# Patient Record
Sex: FEMALE | Race: ASIAN | NOT HISPANIC OR LATINO
[De-identification: names, ages, dates, MRNs, and addresses within clinical notes are randomized per-mention and may not be internally consistent; named-entity substitution may affect disease eponyms.]

---

## 2021-05-09 ENCOUNTER — TRANSCRIPTION ENCOUNTER (OUTPATIENT)
Age: 46
End: 2021-05-09

## 2022-05-06 ENCOUNTER — EMERGENCY (EMERGENCY)
Facility: HOSPITAL | Age: 47
LOS: 1 days | Discharge: HOME | End: 2022-05-06
Attending: EMERGENCY MEDICINE
Payer: COMMERCIAL

## 2022-05-06 VITALS — HEIGHT: 67 IN | WEIGHT: 134.92 LBS

## 2022-05-06 VITALS
SYSTOLIC BLOOD PRESSURE: 110 MMHG | RESPIRATION RATE: 20 BRPM | DIASTOLIC BLOOD PRESSURE: 74 MMHG | OXYGEN SATURATION: 99 % | TEMPERATURE: 99 F | HEART RATE: 65 BPM

## 2022-05-06 PROCEDURE — 99285 EMERGENCY DEPT VISIT HI MDM: CPT | Mod: 25

## 2022-05-06 PROCEDURE — 29130 APPL FINGER SPLINT STATIC: CPT | Mod: RT

## 2022-05-06 PROCEDURE — 73060 X-RAY EXAM OF HUMERUS: CPT | Mod: 26,RT

## 2022-05-06 PROCEDURE — 73130 X-RAY EXAM OF HAND: CPT

## 2022-05-06 PROCEDURE — 73030 X-RAY EXAM OF SHOULDER: CPT | Mod: 26,RT

## 2022-05-06 PROCEDURE — 73130 X-RAY EXAM OF HAND: CPT | Mod: 26,RT

## 2022-05-06 PROCEDURE — 72125 CT NECK SPINE W/O DYE: CPT | Mod: MA

## 2022-05-06 PROCEDURE — 73110 X-RAY EXAM OF WRIST: CPT | Mod: 26,RT

## 2022-05-06 PROCEDURE — 73090 X-RAY EXAM OF FOREARM: CPT | Mod: 26,LT

## 2022-05-06 PROCEDURE — 73110 X-RAY EXAM OF WRIST: CPT

## 2022-05-06 PROCEDURE — 73080 X-RAY EXAM OF ELBOW: CPT | Mod: 26,RT

## 2022-05-06 PROCEDURE — 73090 X-RAY EXAM OF FOREARM: CPT

## 2022-05-06 PROCEDURE — 73060 X-RAY EXAM OF HUMERUS: CPT

## 2022-05-06 PROCEDURE — 99284 EMERGENCY DEPT VISIT MOD MDM: CPT | Mod: 25

## 2022-05-06 PROCEDURE — 29125 APPL SHORT ARM SPLINT STATIC: CPT

## 2022-05-06 PROCEDURE — 72125 CT NECK SPINE W/O DYE: CPT | Mod: 26,MA

## 2022-05-06 PROCEDURE — 73080 X-RAY EXAM OF ELBOW: CPT

## 2022-05-06 PROCEDURE — 73030 X-RAY EXAM OF SHOULDER: CPT

## 2022-05-06 RX ORDER — ACETAMINOPHEN 500 MG
975 TABLET ORAL ONCE
Refills: 0 | Status: COMPLETED | OUTPATIENT
Start: 2022-05-06 | End: 2022-05-06

## 2022-05-06 RX ADMIN — Medication 975 MILLIGRAM(S): at 13:53

## 2022-05-06 NOTE — ED PROVIDER NOTE - CARE PLAN
Principal Discharge DX:	Sprain or strain of right wrist or hand  Secondary Diagnosis:	Cervical strain   1
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
## 2022-05-06 NOTE — ED PROVIDER NOTE - PHYSICAL EXAMINATION
NAD. VSS. Afebrile, NO facial or scalp tender. Mild cervical midline tender without obvious swelling or lesions. Lungs clear. ABD soft, non tender, and no seat belt signs. +Right shoulder/elbow and wrist tender with diminished ROMS. NAD. VSS. Afebrile, +PERRL, EOMI. NO facial or scalp tender. Mild cervical midline tender without obvious swelling or lesions. Lungs clear. ABD soft, non tender, and no seat belt signs. +Right shoulder/elbow and wrist tender with diminished ROMS. Diminished sensory and strengths of right hand/fingers. +Palpable radial pulse and cap refill <2sec. NAD. VSS. Afebrile, +PERRL, EOMI. NO facial or scalp tender. Mild cervical midline tender without obvious swelling or lesions. Lungs clear. ABD soft, non tender, and no seat belt signs. +Right shoulder/elbow and wrist tender with diminished ROMS. Diminished sensory and strengths of right hand/fingers. +Palpable radial pulse and cap refill <2sec. +Swelling to right thumb with localized ecchymosis.
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
## 2022-05-06 NOTE — ED ADULT NURSE NOTE - NSIMPLEMENTINTERV_GEN_ALL_ED
Implemented All Fall Risk Interventions:  [place] to call system. Call bell, personal items and telephone within reach. Instruct patient to call for assistance. Room bathroom lighting operational. Non-slip footwear when patient is off stretcher. Physically safe environment: no spills, clutter or unnecessary equipment. Stretcher in lowest position, wheels locked, appropriate side rails in place. Provide visual cue, wrist band, yellow gown, etc. Monitor gait and stability. Monitor for mental status changes and reorient to person, place, and time. Review medications for side effects contributing to fall risk. Reinforce activity limits and safety measures with patient and family.

## 2022-05-06 NOTE — ED PROVIDER NOTE - ATTENDING APP SHARED VISIT CONTRIBUTION OF CARE
Attending MD Gonzalez:   I personally have seen and examined this patient.  NP note reviewed and agree on plan of care and except where noted.  See MDM for details. Please see my MDM for further details.

## 2022-05-06 NOTE — ED PROVIDER NOTE - CARE PROVIDER_API CALL
Tima Rivero)  Orthopaedic Surgery  [address], [address]  [place] [address]  Phone: [phone]  Fax: [phone]  Follow Up Time:     Aristides Duenas)  Orthopaedic Surgery  [address], [address]  [place] [address]  Phone: [phone]  Fax: [phone]  Follow Up Time:

## 2022-05-06 NOTE — ED PROVIDER NOTE - PATIENT PORTAL LINK FT
You can access the FollowMyHealth Patient Portal offered by [facility] by registering at the following website: [email or web address]. By joining [facility]’s FollowMyHealth portal, you will also be able to view your health information using other applications (apps) compatible with our system.

## 2022-05-06 NOTE — ED PROVIDER NOTE - NS ED ATTENDING STATEMENT MOD
This was a shared visit with the CLAUDINE. I reviewed and verified the documentation and independently performed the documented:

## 2022-05-06 NOTE — ED PROVIDER NOTE - PROGRESS NOTE DETAILS
Mild cervical midline tender without obvious swelling or lesion and Bearden J c-collar applied. Pt went to CT. RUE N/V- intact. C-collar cleared; no midline tender, full ROMs, and neuro-intact.

## 2022-05-09 PROBLEM — Z78.9 OTHER SPECIFIED HEALTH STATUS: Chronic | Status: ACTIVE | Noted: 2022-05-06

## 2022-05-10 ENCOUNTER — NON-APPOINTMENT (OUTPATIENT)
Age: 47
End: 2022-05-10

## 2022-05-10 ENCOUNTER — APPOINTMENT (OUTPATIENT)
Dept: MRI IMAGING | Facility: IMAGING CENTER | Age: 47
End: 2022-05-10
Payer: COMMERCIAL

## 2022-05-10 ENCOUNTER — OUTPATIENT (OUTPATIENT)
Dept: OUTPATIENT SERVICES | Facility: HOSPITAL | Age: 47
LOS: 1 days | End: 2022-05-10
Payer: COMMERCIAL

## 2022-05-10 ENCOUNTER — APPOINTMENT (OUTPATIENT)
Dept: ORTHOPEDIC SURGERY | Facility: CLINIC | Age: 47
End: 2022-05-10
Payer: COMMERCIAL

## 2022-05-10 DIAGNOSIS — S69.91XA UNSPECIFIED INJURY OF RIGHT WRIST, HAND AND FINGER(S), INITIAL ENCOUNTER: ICD-10-CM

## 2022-05-10 PROCEDURE — 29125 APPL SHORT ARM SPLINT STATIC: CPT | Mod: RT

## 2022-05-10 PROCEDURE — 73218 MRI UPPER EXTREMITY W/O DYE: CPT

## 2022-05-10 PROCEDURE — 99203 OFFICE O/P NEW LOW 30 MIN: CPT | Mod: 25

## 2022-05-10 PROCEDURE — 99072 ADDL SUPL MATRL&STAF TM PHE: CPT

## 2022-05-10 PROCEDURE — 73218 MRI UPPER EXTREMITY W/O DYE: CPT | Mod: 26,RT
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
## 2022-05-10 NOTE — RETURN TO WORK/SCHOOL
[FreeTextEntry1] : Ms. DIAMOND WRIGHT was seen in the office today on 05/10/2022 and evaluated by me for an Orthopedic visit. Please be advised that she will remain out of work until further notice at this time.\par \par Sincerely,\par \par Dr. Aristides Duenas M.D. on 05/10/2022.

## 2022-05-10 NOTE — DISCUSSION/SUMMARY
[FreeTextEntry1] : The underlying pathophysiology was reviewed with the patient. XR films were reviewed with the patient. Discussed at length the nature of the patient’s condition. The right thumb symptoms appear secondary to partial versus full thickness tear of the UCL to the MP joint.\par \par At this time, given her physical exam and lack of evidence of fracture to the hand, I am suspicious for a tear of the MP joint, UCL. I am therefore recommending an MRI for further evaluation. I did tell her depending on what the MRI results show, treatment options consist of either casting or surgical repair.\par An MRI of the left thumb was ordered to evaluate for UCL tear of the MP joint. Patient will follow-up to review the results and discuss further treatment recommendations.\par \par Finally, she was provided with a note stating that she will remain out of work until further notice. \par \par All questions answered, understanding verbalized. Patient in agreement with plan of care.

## 2022-05-10 NOTE — END OF VISIT
[FreeTextEntry3] : All medical record entries made by the Scribe were at my,  Dr. Aristides Duenas MD., direction and personally dictated by me on 05/10/2022. I have personally reviewed the chart and agree that the record accurately reflects my personal performance of the history, physical exam, assessment and plan.

## 2022-05-10 NOTE — PHYSICAL EXAM
[de-identified] : Patient is WDWN, alert, and in no acute distress. Breathing is unlabored. She is grossly oriented to person, place, and time.\par \par Right Wrist:\par There is bruising and discoloration noted dorsally through the hand, in the region of the CMC and MP joints.\par She is able to oppose the thumb to the index finger but with gross pain.\par Tenderness to palpation over the MP joint, in the region of the UCL. Nontender to the RCL.\par Edema noted to the digits.\par Numbness noted distally to the thumb [de-identified] : EXAM: XR ELBOW COMP MIN 3V RT\par EXAM: XR SHOULDER COMP MIN 2V RT\par EXAM: XR HUMERUS MIN 2 VIEWS RT\par EXAM: XR FOREARM 2 VIEWS RT\par EXAM: XR WRIST NAVICULAR MIN 3V RT\par EXAM: XR HAND MIN 3 VIEWS RT\par PROCEDURE DATE: 05/06/2022\par IMPRESSION:\par No tracking soft tissue gas collections or radiopaque foreign bodies.\par \par No fractures, dislocations, or osseous or joint deformities in above imaged anatomic regions.\par \par Preserved joint spaces throughout and no joint margin erosions.\par \par No discrete lytic or blastic lesions.\par \par ADAL LARSON MD; Attending Radiologist\par This document has been electronically signed. May 6 2022 3:07PM

## 2022-05-11 ENCOUNTER — NON-APPOINTMENT (OUTPATIENT)
Age: 47
End: 2022-05-11

## 2022-05-11 ENCOUNTER — APPOINTMENT (OUTPATIENT)
Dept: ORTHOPEDIC SURGERY | Facility: CLINIC | Age: 47
End: 2022-05-11
Payer: COMMERCIAL

## 2022-05-11 VITALS — WEIGHT: 135 LBS | BODY MASS INDEX: 21.19 KG/M2 | HEIGHT: 67 IN
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
## 2022-05-11 DIAGNOSIS — M54.2 CERVICALGIA: ICD-10-CM

## 2022-05-11 DIAGNOSIS — S13.9XXA SPRAIN OF JOINTS AND LIGAMENTS OF UNSPECIFIED PARTS OF NECK, INITIAL ENCOUNTER: ICD-10-CM

## 2022-05-11 DIAGNOSIS — M47.812 SPONDYLOSIS W/OUT MYELOPATHY OR RADICULOPATHY, CERVICAL REGION: ICD-10-CM

## 2022-05-11 DIAGNOSIS — M54.9 DORSALGIA, UNSPECIFIED: ICD-10-CM

## 2022-05-11 DIAGNOSIS — Z87.891 PERSONAL HISTORY OF NICOTINE DEPENDENCE: ICD-10-CM

## 2022-05-11 DIAGNOSIS — S39.012A STRAIN OF MUSCLE, FASCIA AND TENDON OF LOWER BACK, INITIAL ENCOUNTER: ICD-10-CM

## 2022-05-11 DIAGNOSIS — M54.41 LUMBAGO WITH SCIATICA, RIGHT SIDE: ICD-10-CM

## 2022-05-11 DIAGNOSIS — Z86.59 PERSONAL HISTORY OF OTHER MENTAL AND BEHAVIORAL DISORDERS: ICD-10-CM

## 2022-05-11 PROCEDURE — 99215 OFFICE O/P EST HI 40 MIN: CPT

## 2022-05-11 PROCEDURE — 72100 X-RAY EXAM L-S SPINE 2/3 VWS: CPT

## 2022-05-11 PROCEDURE — 99072 ADDL SUPL MATRL&STAF TM PHE: CPT

## 2022-05-11 RX ORDER — IBUPROFEN 800 MG/1
800 TABLET, FILM COATED ORAL
Qty: 90 | Refills: 0 | Status: ACTIVE | COMMUNITY
Start: 2022-05-11 | End: 1900-01-01

## 2022-05-11 NOTE — REASON FOR VISIT
[Initial Visit] : an initial visit for [No Fault] : this visit is related to no fault  [Degenerative Joint Disease] : degenerative joint disease [Back Pain] : back pain [Neck Pain] : neck pain [Radiculopathy] : radiculopathy
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
## 2022-05-11 NOTE — PHYSICAL EXAM
[de-identified] : She is fully alert and oriented with a normal mood and affect.  She is in no acute distress as the take the history.  She ambulates with a slow but otherwise normal gait.  She can tiptoe and heel walk.  There is no evidence of unsteadiness or spasticity.  There are no cutaneous abnormalities or palpable bony defects of the spine.  There is no evidence of shortness of breath or respiratory distress.  There is no paravertebral muscle spasm in the lower back but there is discomfort with side bending.  There is a trace of sciatic notch sensitivity on the right but none on the left.  There is some very mild right-sided trochanteric tenderness but none on the left.  Forward flexion of the spine to 60 degrees causes lower back discomfort.  Her lower extremity neurological examination revealed 1-2+ symmetrical reflexes.  She came planes of pain as I tested her reflexes.  Motor power is normal in manual testing in all lower extremity groups and sensation is normal to light touch in all dermatomes.  Straight leg raising is negative to 90 degrees in the sitting position bilaterally.  Her hips and her knees have a full and painless range of motion with normal stability.  Vascular examination shows no evidence of varicosities and there is no lymphedema.  There are no cutaneous abnormalities of the upper or lower extremities.  She has a splint on her right thumb and wrist secondary to her thumb injury.  Her upper extremity reflexes are 1+ and symmetrical.  I could not test her right brachial radialis reflex secondary to her splint.  Motor power is normal to manual testing in all upper extremity groups other than those that I could not test secondary to the splint on the right hand and.  Sensory exam is normal.  Shoulder range of motion is full but there is discomfort on range of motion of the right shoulder.  Range of motion of the cervical spine showed flexion with the chin reaching to within 2 fingerbreadths of the chest.  Extension at 40 degrees caused neck pain.  Rotation was to 60 degrees bilaterally with neck pain and tilt of 20 degrees bilaterally with neck pain. [de-identified] : I reviewed multiple outside x-rays of her upper extremity done in the emergency room.\par \par I reviewed a CT scan of the cervical spine.  Sagittal alignment is normal and there are no destructive changes.  There are some uncovertebral and disc space arthritic changes at the C5-6 level.  Sagittal alignment is normal and there are no destructive changes.  There is no evidence of a fracture.\par \par In light of her symptoms I obtained AP and lateral x-rays of the lumbar spine.  Sagittal alignment is normal and there are no destructive changes.  There is no evidence of a fracture.  There are some very minimal degenerative changes.

## 2022-05-11 NOTE — DISCUSSION/SUMMARY
[Medication Risks Reviewed] : Medication risks reviewed [de-identified] : She will rest and use moist heat.  I have started on ibuprofen 800 mg 3 times a day as a nonsteroidal anti-inflammatory.  She will call if there are problems with the medication or worsening of her symptoms and I will see her for follow-up in 3 weeks.  She is scheduled for an MRI of her right thumb and may need surgery for reconstruction of her ulnar collateral ligament.

## 2022-05-17 ENCOUNTER — APPOINTMENT (OUTPATIENT)
Dept: ORTHOPEDIC SURGERY | Facility: CLINIC | Age: 47
End: 2022-05-17

## 2022-05-26 ENCOUNTER — APPOINTMENT (OUTPATIENT)
Dept: ORTHOPEDIC SURGERY | Facility: CLINIC | Age: 47
End: 2022-05-26
Payer: COMMERCIAL

## 2022-05-26 DIAGNOSIS — S63.641A SPRAIN OF METACARPOPHALANGEAL JOINT OF RIGHT THUMB, INITIAL ENCOUNTER: ICD-10-CM

## 2022-05-26 PROCEDURE — 29075 APPL CST ELBW FNGR SHORT ARM: CPT | Mod: RT

## 2022-05-26 PROCEDURE — 99072 ADDL SUPL MATRL&STAF TM PHE: CPT

## 2022-05-26 PROCEDURE — 99213 OFFICE O/P EST LOW 20 MIN: CPT | Mod: 25

## 2022-05-27 NOTE — DISCUSSION/SUMMARY
[FreeTextEntry1] : The underlying pathophysiology was reviewed with the patient. MRI films were reviewed with the patient. Discussed at length the nature of the patient’s condition. The right thumb symptoms appear secondary to \par partial tearing of the volar oblique ligament at the thumb carpometacarpal joint.\par \par At this time, given the nature of the injury as documented above I have recommended cast immobilization for 6 weeks. I discussed with the patient that the cast will provide her with the most protection and give the ligament the best chance to heal.\par She was placed into a right thumb spica cast on 5/26/22.\par Proper cast care instructions were reviewed with the patient.\par She was advised that she may use the hand for ADLs while casted.\par \par All questions answered, understanding verbalized. Patient in agreement with plan of care. She will follow up in 6 weeks for cast removal. She was instructed to return sooner if she has issues while casted.

## 2022-05-27 NOTE — ADDENDUM
[FreeTextEntry1] : I, Elise Retana wrote this note acting as a scribe for Dr. Aristides Duenas on May 26, 2022.

## 2022-05-27 NOTE — END OF VISIT
[FreeTextEntry3] : All medical record entries made by the Scribe were at my,  Dr. Aristides Duenas MD., direction and personally dictated by me on 05/26/2022. I have personally reviewed the chart and agree that the record accurately reflects my personal performance of the history, physical exam, assessment and plan.

## 2022-05-27 NOTE — RETURN TO WORK/SCHOOL
[FreeTextEntry1] : Ms. DIAMOND WRIGHT was seen in the office today on 05/26/2022 and evaluated by me for an Orthopedic visit. Please be advised that she will remain out of work until further notice.\par \par Sincerely,\par \par Dr. Aristides Duenas M.D. on 05/26/2022.
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
## 2022-05-27 NOTE — PHYSICAL EXAM
[de-identified] : Patient is WDWN, alert, and in no acute distress. Breathing is unlabored. She is grossly oriented to person, place, and time.\par \par Right Wrist:\par Resolved bruising dorsally through the hand, in the region of the CMC and MP joints.\par She is able to oppose the thumb to the index finger but with gross pain.\par Tenderness to palpation over the MP joint, in the region of the UCL. Nontender to the RCL.\par Tenderness noted volarly along the CMC joint.\par Edema noted to the digits.\par Numbness noted distally to the thumb [de-identified] : EXAM: [id number] - MR THUMB RT\par PROCEDURE DATE:  05/10/2022\par IMPRESSION: \par No MCP joint UCL tear or Stener lesion.\par Partial tearing of the volar oblique ligament at the thumb carpometacarpal joint with periligamentous edema.\par \par YAIMA POPE MD; Attending Radiologist\par This document has been electronically signed. May 11 2022  8:33AM\par \par ------------------------------------------------------------------------------------------------------------------------------------------------------------------------------------\par \par EXAM: XR ELBOW COMP MIN 3V RT\par EXAM: XR SHOULDER COMP MIN 2V RT\par EXAM: XR HUMERUS MIN 2 VIEWS RT\par EXAM: XR FOREARM 2 VIEWS RT\par EXAM: XR WRIST NAVICULAR MIN 3V RT\par EXAM: XR HAND MIN 3 VIEWS RT\par PROCEDURE DATE: 05/06/2022\par IMPRESSION:\par No tracking soft tissue gas collections or radiopaque foreign bodies.\par \par No fractures, dislocations, or osseous or joint deformities in above imaged anatomic regions.\par \par Preserved joint spaces throughout and no joint margin erosions.\par \par No discrete lytic or blastic lesions.\par \par ADAL LARSON MD; Attending Radiologist\par This document has been electronically signed. May 6 2022 3:07PM

## 2022-06-02 ENCOUNTER — APPOINTMENT (OUTPATIENT)
Dept: ORTHOPEDIC SURGERY | Facility: CLINIC | Age: 47
End: 2022-06-02
Payer: COMMERCIAL

## 2022-06-02 PROCEDURE — 99072 ADDL SUPL MATRL&STAF TM PHE: CPT

## 2022-06-02 PROCEDURE — 29125 APPL SHORT ARM SPLINT STATIC: CPT | Mod: RT

## 2022-06-02 PROCEDURE — 99213 OFFICE O/P EST LOW 20 MIN: CPT | Mod: 25

## 2022-06-02 NOTE — END OF VISIT
[FreeTextEntry3] : All medical record entries made by the Scribe were at my,  Dr. Aristides Duenas MD., direction and personally dictated by me on 06/02/2022. I have personally reviewed the chart and agree that the record accurately reflects my personal performance of the history, physical exam, assessment and plan.

## 2022-06-02 NOTE — REASON FOR VISIT
[Follow-Up Visit] : a follow-up visit for [No Fault] : This visit is related to no fault  [FreeTextEntry2] : Right wrist

## 2022-06-02 NOTE — DISCUSSION/SUMMARY
[FreeTextEntry1] : The underlying pathophysiology was reviewed with the patient. MRI films were reviewed with the patient. Discussed at length the nature of the patient’s condition. The right thumb symptoms appear secondary to \par partial tearing of the volar oblique ligament at the thumb carpometacarpal joint.\par \par At this time, given her discomfort in the cast, the thumb spica cast was removed in office today on 6/2/22. We discussed further treatment options of placement of a new cast or immobilization in a splint which must be adorned at all times. She understands that if she uses the hand without the splint adorned, that she may tear the ligament further. \par She was placed into a well molded fiber glass splint for support again to be worn at all times.\par \par All questions answered, understanding verbalized. Patient in agreement with plan of care. Follow up in 2 weeks for reassessment.

## 2022-06-02 NOTE — PHYSICAL EXAM
[de-identified] : Patient is WDWN, alert, and in no acute distress. Breathing is unlabored. She is grossly oriented to person, place, and time.\par \par Right Hand:\par Patient presents in a right thumb spica cast which was removed in office today due to discomfort.\par While that cast was removed, there is resolved bruising dorsally through the hand, in the region of the CMC and MP joints. ROM to the thumb not accessed Tenderness noted volarly along the CMC joint. Numbness noted distally to the thumb  [de-identified] : EXAM: [id number] - MR THUMB RT\par PROCEDURE DATE: 05/10/2022\par IMPRESSION: \par No MCP joint UCL tear or Stener lesion.\par Partial tearing of the volar oblique ligament at the thumb carpometacarpal joint with periligamentous edema.\par \par YAIMA POPE MD; Attending Radiologist\par This document has been electronically signed. May 11 2022 8:33AM
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
## 2022-06-02 NOTE — ADDENDUM
[FreeTextEntry1] : I, Elise Retana wrote this note acting as a scribe for Dr. Aristides Duenas on Jun 02, 2022.

## 2022-07-07 ENCOUNTER — APPOINTMENT (OUTPATIENT)
Dept: ORTHOPEDIC SURGERY | Facility: CLINIC | Age: 47
End: 2022-07-07

## 2022-07-07 PROCEDURE — 99072 ADDL SUPL MATRL&STAF TM PHE: CPT

## 2022-07-07 PROCEDURE — 29125 APPL SHORT ARM SPLINT STATIC: CPT | Mod: 59,RT

## 2022-07-07 PROCEDURE — 20550 NJX 1 TENDON SHEATH/LIGAMENT: CPT | Mod: RT

## 2022-07-07 PROCEDURE — 99213 OFFICE O/P EST LOW 20 MIN: CPT | Mod: 25

## 2022-07-07 NOTE — PHYSICAL EXAM
[de-identified] : Patient is WDWN, alert, and in no acute distress. Breathing is unlabored. She is grossly oriented to person, place, and time.\par \par Right Hand:\par Patient presents in a right thumb spica cast which was removed in office today due to discomfort.\par While that cast was removed, there is resolved bruising dorsally through the hand, in the region of the CMC and MP joints. ROM to the thumb not accessed Tenderness noted volarly along the CMC joint. Numbness noted distally to the thumb  [de-identified] : EXAM: [id number] - MR THUMB RT\par PROCEDURE DATE: 05/10/2022\par IMPRESSION: \par No MCP joint UCL tear or Stener lesion.\par Partial tearing of the volar oblique ligament at the thumb carpometacarpal joint with periligamentous edema.\par \par YAIMA POPE MD; Attending Radiologist\par This document has been electronically signed. May 11 2022 8:33AM
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
## 2022-07-07 NOTE — DISCUSSION/SUMMARY
[FreeTextEntry1] : The underlying pathophysiology was reviewed with the patient. MRI films were reviewed with the patient. Discussed at length the nature of the patient’s condition. The right thumb symptoms appear secondary to \par partial tearing of the volar oblique ligament at the thumb carpometacarpal joint.\par \par The patient wishes to proceed with a cortisone injection at this time. \par \par The skin was prepped with alcohol and sprayed with  Ethyl Chloride. An injection of 0.5 cc 1% Lidocaine without epinephrine, 0.25 cc Kenalog 40 mg, and 0.25 cc  Dexamethasone was administered into the right first extensor compartment. The patient tolerated the procedure well. Rest  and apply ice.	\par \par Well fitting thumb spica fiberglass splint applied today.\par \par All questions answered, understanding verbalized. Patient in agreement with plan of care. Follow up in  4-6 weeks if pain persists.

## 2022-07-21 ENCOUNTER — APPOINTMENT (OUTPATIENT)
Dept: ORTHOPEDIC SURGERY | Facility: CLINIC | Age: 47
End: 2022-07-21

## 2022-08-11 ENCOUNTER — APPOINTMENT (OUTPATIENT)
Dept: ORTHOPEDIC SURGERY | Facility: CLINIC | Age: 47
End: 2022-08-11

## 2022-08-11 VITALS — WEIGHT: 126 LBS | HEIGHT: 67 IN | BODY MASS INDEX: 19.78 KG/M2

## 2022-08-11 PROCEDURE — 99213 OFFICE O/P EST LOW 20 MIN: CPT

## 2022-08-11 PROCEDURE — 99072 ADDL SUPL MATRL&STAF TM PHE: CPT

## 2022-08-11 RX ORDER — COVID-19 MOLECULAR TEST ASSAY
KIT MISCELLANEOUS
Qty: 6 | Refills: 0 | Status: ACTIVE | COMMUNITY
Start: 2022-07-12

## 2022-08-11 RX ORDER — MENTHOL 40 MG/ML
4 GEL TOPICAL
Qty: 1 | Refills: 0 | Status: ACTIVE | COMMUNITY
Start: 2022-08-11 | End: 1900-01-01

## 2022-08-11 RX ORDER — BENZONATATE 200 MG/1
200 CAPSULE ORAL
Qty: 30 | Refills: 0 | Status: ACTIVE | COMMUNITY
Start: 2022-07-13

## 2022-08-11 RX ORDER — CLONAZEPAM 0.5 MG/1
0.5 TABLET ORAL
Qty: 30 | Refills: 0 | Status: ACTIVE | COMMUNITY
Start: 2022-08-07

## 2022-08-11 RX ORDER — SERTRALINE 25 MG/1
25 TABLET, FILM COATED ORAL
Qty: 30 | Refills: 0 | Status: ACTIVE | COMMUNITY
Start: 2022-07-22

## 2022-08-11 RX ORDER — NIRMATRELVIR AND RITONAVIR 300-100 MG
20 X 150 MG & KIT ORAL
Qty: 30 | Refills: 0 | Status: ACTIVE | COMMUNITY
Start: 2022-07-12

## 2022-08-11 RX ORDER — CARIPRAZINE 1.5 MG/1
1.5 CAPSULE, GELATIN COATED ORAL
Qty: 30 | Refills: 0 | Status: ACTIVE | COMMUNITY
Start: 2022-07-22
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
## 2022-08-11 NOTE — END OF VISIT
[FreeTextEntry3] : All medical record entries made by the Scribe were at my,  Dr. Aristides Duenas MD., direction and personally dictated by me on 08/11/2022. I have personally reviewed the chart and agree that the record accurately reflects my personal performance of the history, physical exam, assessment and plan.

## 2022-08-11 NOTE — DISCUSSION/SUMMARY
[FreeTextEntry1] : The underlying pathophysiology was reviewed with the patient. MRI films were reviewed with the patient. Discussed at length the nature of the patient’s condition. The right thumb symptoms appear secondary to \par partial tearing of the volar oblique ligament at the thumb carpometacarpal joint.\par \par At this time, I recommended symptomatic treatment consisting of use of oral and topical antiinflammatories. She was instructed on use of a wrist brace as needed. She is scheduled to begin hand therapy. She did inquire about another MRI which I told her I wouldn't recommend as it will not add anything to her treated and that it will most likely still show some sort of abnormality given that ligaments take a great deal of time to heal.\par \par RX: Biofreeze.\par \par All questions answered, understanding verbalized. Patient in agreement with plan of care. Follow up in 3 months for reassessment.

## 2022-08-11 NOTE — ADDENDUM
[FreeTextEntry1] : I, Elise Retana wrote this note acting as a scribe for Dr. Aristides Duenas on Aug 11, 2022.

## 2022-08-11 NOTE — PHYSICAL EXAM
[de-identified] : Patient is WDWN, alert, and in no acute distress. Breathing is unlabored. She is grossly oriented to person, place, and time.\par \par Right Hand:\par ROM to the right thumb is full into flexion and extension.\par There is full wrist motion with pain noted dorsally.\par Tenderness noted volarly along the CMC joint. \par Numbness noted distally to the thumb  [de-identified] : EXAM: [id number] - MR THUMB RT\par PROCEDURE DATE: 05/10/2022\par IMPRESSION: \par No MCP joint UCL tear or Stener lesion.\par Partial tearing of the volar oblique ligament at the thumb carpometacarpal joint with periligamentous edema.\par \par YAIMA POPE MD; Attending Radiologist\par This document has been electronically signed. May 11 2022 8:33AM

## 2022-11-14 ENCOUNTER — APPOINTMENT (OUTPATIENT)
Dept: ORTHOPEDIC SURGERY | Facility: CLINIC | Age: 47
End: 2022-11-14

## 2022-11-14 DIAGNOSIS — M65.4 RADIAL STYLOID TENOSYNOVITIS [DE QUERVAIN]: ICD-10-CM

## 2022-11-14 DIAGNOSIS — S63.641D SPRAIN OF METACARPOPHALANGEAL JOINT OF RIGHT THUMB, SUBSEQUENT ENCOUNTER: ICD-10-CM

## 2022-11-14 PROCEDURE — 99213 OFFICE O/P EST LOW 20 MIN: CPT

## 2022-11-14 PROCEDURE — 99072 ADDL SUPL MATRL&STAF TM PHE: CPT
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
## 2022-11-14 NOTE — DISCUSSION/SUMMARY
[FreeTextEntry1] : The underlying pathophysiology was reviewed with the patient. MRI films were reviewed with the patient. Discussed at length the nature of the patient’s condition. The right thumb symptoms appear secondary to \par partial tearing of the volar oblique ligament at the thumb carpometacarpal joint.\par \par At this time, we discussed further treatment options consisting of a repeat cortisone injection at the right first dorsal compartment versus symptomatic treatment. I told her if the pain is tolerated at the right wrist, I would recommend she wait for a repeat cortisone injection given that lifetime there is a limit of 3. She is in agreement. I recommended use of topical and oral antiinflammatories as well as ice and/or heat.\par \par RX: Ibuprofen 600mg, BID (30 tablets).\par \par All questions answered, understanding verbalized. Patient in agreement with plan of care. Follow up as needed.

## 2022-11-14 NOTE — ADDENDUM
[FreeTextEntry1] : I, Elsie Retana wrote this note acting as a scribe for Dr. Aristides Duenas on Nov 14, 2022.

## 2022-11-14 NOTE — PHYSICAL EXAM
[de-identified] : Patient is WDWN, alert, and in no acute distress. Breathing is unlabored. She is grossly oriented to person, place, and time.\par \par Right Hand:\par ROM to the right thumb is full into flexion and extension.\par There is full wrist motion with pain noted dorsally.\par Tenderness noted volarly along the CMC joint. \par Numbness noted distally to the thumb  [de-identified] : EXAM: [id number] - MR THUMB RT\par PROCEDURE DATE: 05/10/2022\par IMPRESSION: \par No MCP joint UCL tear or Stener lesion.\par Partial tearing of the volar oblique ligament at the thumb carpometacarpal joint with periligamentous edema.\par \par YAIMA POPE MD; Attending Radiologist\par This document has been electronically signed. May 11 2022 8:33AM

## 2022-12-19 ENCOUNTER — RX RENEWAL (OUTPATIENT)
Age: 47
End: 2022-12-19

## 2022-12-19 RX ORDER — IBUPROFEN 600 MG/1
600 TABLET, FILM COATED ORAL
Qty: 60 | Refills: 0 | Status: ACTIVE | COMMUNITY
Start: 2022-11-14 | End: 1900-01-01